# Patient Record
Sex: MALE | Race: AMERICAN INDIAN OR ALASKA NATIVE | HISPANIC OR LATINO
[De-identification: names, ages, dates, MRNs, and addresses within clinical notes are randomized per-mention and may not be internally consistent; named-entity substitution may affect disease eponyms.]

---

## 2023-05-01 ENCOUNTER — APPOINTMENT (OUTPATIENT)
Dept: CARDIOLOGY | Facility: CLINIC | Age: 83
End: 2023-05-01
Payer: MEDICARE

## 2023-05-01 ENCOUNTER — NON-APPOINTMENT (OUTPATIENT)
Age: 83
End: 2023-05-01

## 2023-05-01 VITALS
WEIGHT: 148 LBS | HEIGHT: 62 IN | BODY MASS INDEX: 27.23 KG/M2 | HEART RATE: 80 BPM | DIASTOLIC BLOOD PRESSURE: 80 MMHG | SYSTOLIC BLOOD PRESSURE: 134 MMHG | OXYGEN SATURATION: 90 %

## 2023-05-01 DIAGNOSIS — E78.5 HYPERLIPIDEMIA, UNSPECIFIED: ICD-10-CM

## 2023-05-01 DIAGNOSIS — E11.9 TYPE 2 DIABETES MELLITUS W/OUT COMPLICATIONS: ICD-10-CM

## 2023-05-01 DIAGNOSIS — I10 ESSENTIAL (PRIMARY) HYPERTENSION: ICD-10-CM

## 2023-05-01 DIAGNOSIS — R07.9 CHEST PAIN, UNSPECIFIED: ICD-10-CM

## 2023-05-01 PROBLEM — Z00.00 ENCOUNTER FOR PREVENTIVE HEALTH EXAMINATION: Status: ACTIVE | Noted: 2023-05-01

## 2023-05-01 PROCEDURE — 99204 OFFICE O/P NEW MOD 45 MIN: CPT

## 2023-05-01 PROCEDURE — 93000 ELECTROCARDIOGRAM COMPLETE: CPT

## 2023-05-01 NOTE — CARDIOLOGY SUMMARY
[de-identified] : 5/1/2023, Sinus Rhythm \par -Left axis -anterior fascicular block. \par Voltage criteria for LVH (R(aVL) exceeds 1.26 mV). \par -Decreasing R-wave progression -may be secondary to left ventricular \par hypertrophy consider old anterior infarct.

## 2023-05-01 NOTE — HISTORY OF PRESENT ILLNESS
[FreeTextEntry1] : \par 82-year-old male, history of hypertension, diabetes and hyperlipidemia.  Referred for evaluation of essential hypertension and dizziness?\par \par Patient denies any recent vertigo, he does have gait imbalance for which he has been using a cane for several months but has not had any significant lightheadedness or syncopal episodes.  Denies any palpitations.  He does complain of left-sided chest wall pain mostly when lying down.  No dyspnea on exertion, no other significant constitutional symptoms.  As per the granddaughter, has been compliant with his medications, had labs done at PCP last week and results were unavailable for review.  Has been seen in the past by ENT for severe hearing loss.\par \par Nondrinker, non-smoker.  He is living both here and in [facility] at times.   with 3 children.  Worked as a farmer.

## 2023-05-01 NOTE — DISCUSSION/SUMMARY
[EKG obtained to assist in diagnosis and management of assessed problem(s)] : EKG obtained to assist in diagnosis and management of assessed problem(s) [FreeTextEntry1] : 82-year-old male with multiple cardiac risk factors and atypical chest pain.  EKG suggestive of possible underlying coronary artery disease.  Will further risk stratify with transthoracic echocardiogram and pharmacological nuclear stress testing.  If the above shows no evidence of any significant active ischemia would suggest neurological evaluation and follow-up with ENT.  Blood pressure today is mildly elevated, patient on unknown dose of losartan, suggest consideration of titration further.  We will try to review his most recent labs to make further suggestions.

## 2023-05-01 NOTE — PHYSICAL EXAM
[Well Developed] : well developed [Well Nourished] : well nourished [No Acute Distress] : no acute distress [Normal Conjunctiva] : normal conjunctiva [Normal Venous Pressure] : normal venous pressure [No Carotid Bruit] : no carotid bruit [Normal S1, S2] : normal S1, S2 [No Murmur] : no murmur [No Rub] : no rub [No Gallop] : no gallop [Clear Lung Fields] : clear lung fields [Good Air Entry] : good air entry [No Respiratory Distress] : no respiratory distress  [Soft] : abdomen soft [Non Tender] : non-tender [No Masses/organomegaly] : no masses/organomegaly [Normal Bowel Sounds] : normal bowel sounds [Normal Gait] : normal gait [No Edema] : no edema [No Cyanosis] : no cyanosis [No Clubbing] : no clubbing [No Varicosities] : no varicosities [No Rash] : no rash [No Skin Lesions] : no skin lesions [Moves all extremities] : moves all extremities [No Focal Deficits] : no focal deficits [Normal Speech] : normal speech [Alert and Oriented] : alert and oriented [Normal memory] : normal memory [de-identified] : Complains of pain to palpation over left anterior chest wall at fourth/fifth rib in mid and lateral clavicular lines.

## 2023-05-17 ENCOUNTER — APPOINTMENT (OUTPATIENT)
Dept: CARDIOLOGY | Facility: CLINIC | Age: 83
End: 2023-05-17
Payer: MEDICARE

## 2023-05-17 PROCEDURE — A9500: CPT

## 2023-05-17 PROCEDURE — 78452 HT MUSCLE IMAGE SPECT MULT: CPT

## 2023-05-17 PROCEDURE — 93015 CV STRESS TEST SUPVJ I&R: CPT

## 2023-05-17 RX ADMIN — Medication 0.4 MG/5ML: at 00:00

## 2023-10-04 ENCOUNTER — APPOINTMENT (OUTPATIENT)
Dept: CARDIOLOGY | Facility: CLINIC | Age: 83
End: 2023-10-04